# Patient Record
Sex: MALE | Race: WHITE | Employment: FULL TIME | URBAN - METROPOLITAN AREA
[De-identification: names, ages, dates, MRNs, and addresses within clinical notes are randomized per-mention and may not be internally consistent; named-entity substitution may affect disease eponyms.]

---

## 2023-12-26 ENCOUNTER — TELEPHONE (OUTPATIENT)
Dept: ORTHOPEDIC SURGERY | Age: 38
End: 2023-12-26

## 2023-12-26 ENCOUNTER — PREP FOR PROCEDURE (OUTPATIENT)
Dept: ORTHOPEDIC SURGERY | Age: 38
End: 2023-12-26

## 2023-12-26 ENCOUNTER — OFFICE VISIT (OUTPATIENT)
Dept: ORTHOPEDIC SURGERY | Age: 38
End: 2023-12-26
Payer: COMMERCIAL

## 2023-12-26 ENCOUNTER — HOSPITAL ENCOUNTER (OUTPATIENT)
Dept: GENERAL RADIOLOGY | Age: 38
Discharge: HOME | End: 2023-12-28
Payer: COMMERCIAL

## 2023-12-26 DIAGNOSIS — S82.832A CLOSED FRACTURE OF DISTAL END OF LEFT FIBULA, UNSPECIFIED FRACTURE MORPHOLOGY, INITIAL ENCOUNTER: Primary | ICD-10-CM

## 2023-12-26 DIAGNOSIS — S82.832A CLOSED FRACTURE OF DISTAL END OF LEFT FIBULA, UNSPECIFIED FRACTURE MORPHOLOGY, INITIAL ENCOUNTER: ICD-10-CM

## 2023-12-26 PROCEDURE — 73610 X-RAY EXAM OF ANKLE: CPT

## 2023-12-26 PROCEDURE — 99214 OFFICE O/P EST MOD 30 MIN: CPT

## 2023-12-26 PROCEDURE — 29515 APPLICATION SHORT LEG SPLINT: CPT

## 2023-12-26 RX ORDER — SODIUM CHLORIDE 0.9 % (FLUSH) 0.9 %
5-40 SYRINGE (ML) INJECTION PRN
Status: CANCELLED | OUTPATIENT
Start: 2023-12-26

## 2023-12-26 RX ORDER — ASPIRIN 81 MG/1
81 TABLET ORAL 2 TIMES DAILY
Qty: 60 TABLET | Refills: 3 | Status: SHIPPED | OUTPATIENT
Start: 2023-12-26 | End: 2024-04-24

## 2023-12-26 RX ORDER — ACETAMINOPHEN 325 MG/1
650 TABLET ORAL EVERY 6 HOURS PRN
COMMUNITY

## 2023-12-26 RX ORDER — SODIUM CHLORIDE 9 MG/ML
INJECTION, SOLUTION INTRAVENOUS PRN
Status: CANCELLED | OUTPATIENT
Start: 2023-12-26

## 2023-12-26 RX ORDER — SODIUM CHLORIDE 0.9 % (FLUSH) 0.9 %
5-40 SYRINGE (ML) INJECTION EVERY 12 HOURS SCHEDULED
Status: CANCELLED | OUTPATIENT
Start: 2023-12-26

## 2023-12-26 RX ORDER — HYDROCODONE BITARTRATE AND ACETAMINOPHEN 5; 325 MG/1; MG/1
1 TABLET ORAL EVERY 6 HOURS PRN
Qty: 28 TABLET | Refills: 0 | Status: SHIPPED | OUTPATIENT
Start: 2023-12-26 | End: 2024-01-02

## 2023-12-26 NOTE — PROGRESS NOTES
[facility].   Only one vehicle - per patient, is permitted in parking lot.   Upon entering the parking lot, a voucher ticket will print.    EDUCATION INSTRUCTIONS:           [] [facility] Pamphlet placed in chart.   [] Pre-admission Testing educational folder given  [] Incentive Spirometry,coughing & deep breathing exercises reviewed.   [] Fluoroscopy-Xray used in surgery reviewed with patient. Educational pamphlet placed in chart.  [] Pain: Post-op pain is normal and to be expected. You will be asked to rate your pain from 0-10.  [] Joint camp offered & Joint replacement booklets given.  [] Follow instruction sheet for Ensure/ Protein drinks - provided to you during PAT apt.    MEDICATION INSTRUCTIONS:    [x] Bring a complete list of your medications, please write the last time you took the medicine, give this list to the nurse in Pre-Op.  [x] Take only the following medications the morning of surgery with 1-2 ounces of water: Norco if needed  [] Stop all herbal supplements and vitamins 5 days before surgery. Stop NSAIDS 7 days before surgery.  [] DO NOT take any diabetic medicine the morning of surgery.  Follow instructions for insulin the day before surgery.  [] If you are diabetic and your blood sugar is low or you feel symptomatic, you may drink 1-2 ounces of apple juice or take a glucose tablet.            -The morning of your procedure, you may call the pre-op area if you have concerns about your blood sugar [phone].  [] Use your inhalers the morning of surgery.  Bring your emergency inhaler with you day of surgery.  [x] Follow physician instructions regarding any blood thinners you may be taking.    WHAT TO EXPECT:    [x] The day of surgery you will be greeted and checked in by the [place] lob . In addition, you will be registered in the [place] [facility] by a Patient Access Representative. Please bring your photo ID and insurance card. A nurse will greet you in  accordance to the time you are needed in the pre-op area to prepare you for surgery. Please do not be discouraged if you are not greeted in the order you arrive as there are many variables that are involved in patient preparation. Your patience is greatly appreciated as you wait for your nurse.   [] Delays may occur with surgery and staff will make a sincere effort to keep you informed of delays. If any delays occur with your procedure, we apologize ahead of time for your inconvenience as we recognize the value of your time.

## 2023-12-26 NOTE — H&P
Negative for nausea, vomiting, abdominal pain, diarrhea, constipation  or black or bloody.  Hematologic\Lymphatic:  negative for bleeding, petechiae,   Genitourinary: Negative for hematuria and difficulty urinating.   Musculoskeletal: Negative for neck pain and stiffness. Mild for back pain, negative joint swelling and gait problem.   Skin: Negative for pallor, rash and wound.   Neurological: Negative for dizziness, tremors, seizures, weakness, light-headedness, no TIA or stroke symptoms. No numbness and headaches.   Psychiatric/Behavioral: Negative.      Physical Examination:   General appearance: alert, well appearing, and in no distress,  normal appearing weight  Mental status: alert, oriented to person, place, and time, normal mood, behavior, speech, dress, motor activity, and thought processes  Abdomen: soft, nondistended   Resp:   resp easy and unlabored, no audible wheezes note  Cardiac: distal pulses palpable, skin well perfused  Neurological: alert, oriented X3, normal speech, no focal findings or movement disorder noted, motor and sensory grossly normal bilaterally, normal muscle tone, no tremors, strength 5/5, normal gait and station  HEENT: normochephalic atraumatic, external ears and eyes normal, sclera normal, neck supple  Extremities:   peripheral pulses normal, no edema, redness or tenderness in the calves   Skin: normal coloration, no rashes or open wounds, no suspicious skin lesions noted  Psych: Affect euthymic   Musculoskeletal:    Extremity:  Left Lower Extremity  Splint intact, but only reaches to mid calf and foot is in PF  Compartments supple throughout thigh and leg  Calf supple and nontender  Demonstrates active knee flexion/extension,great toe extension.   States sensation intact to touch above and below pslint  Palpable dorsalis pedis, cap refill brisk in toes, foot warm/perfused.           XR: 12/26/23      Multiple views of L ankle demonstrating distal fibula fracture above the

## 2023-12-26 NOTE — PATIENT INSTRUCTIONS
Procedure: Open reduction with internal fixation of left ankle fracture    Your surgery is scheduled for Wednesday 1/3/24 at 10:00AM with Dr. Laurence Dancer, DO at the main [address] [facility] in [facility] . You will need to report to Preop area  that morning at 8:00AM .   All surgery start times are subject to change. You will be notified by office and/or PAT department if your surgery time changes. If you need to cancel/reschedule your surgery for any reason, please contact Larissa at [phone], ext #[phone] ASAP. You are having Outpatient surgery, but plan for 1-2 nights in the hospital for recovery if needed. Preadmission Testing (PAT) department at [facility] will contact you with further details regarding pre-operative blood work, where to park and enter the hospital, your medication list, etc. If you have any surgery related questions please contact PAT at [address] at [phone]. Nothing to eat or drink after midnight the night before surgery. You may take a pain pill and any other medicine PAT instructs you to take with small sip of water if needed. Continue with ice and elevation to reduce swelling  No weight bearing left lower extremity, use assistive devices if needed (wheelchair, walker, crutches, cane, etc). If you are taking Aspirin or Lovenox, hold the day of surgery. If taking Eliquis, hold this 48 hours prior to surgery. Hold all NSAIDs (non-steroidal anti-inflammatories like Advil, motrin, ibuprofen, etc) 7 days prior to surgery. Call office with any question or concerns: [phone]    All surgical patients will be gradually tapered off narcotic pain medication post operatively, this office will not continue narcotics longer than 6 weeks from date of surgery. If narcotics are required longer than this time period you will be referred to pain management.                   Open Reduction With Internal Fixation of a Limb: What to Expect at [address]

## 2023-12-26 NOTE — TELEPHONE ENCOUNTER
Patient was seen today in our office for ER follow-up appointment from 12- for Left Distal Fibula Fx. This is a Workers Comp Injury case. DOI: 12-. During his appointment today, patient presented the FOS with a Work Status Report Form to fill out and fax back to his employer. Form does not have a fax number listed on it. Patient does not know a fax number either. I called his work supervisor, Heather Todd. No answer. LMOM asking for Buck Jasmine to call the office back with a fax number, please speak with Michelle Ruffin or Tomasa Gallagher. Work Status Report Form was placed in the Workers Comp folder.

## 2023-12-26 NOTE — PROGRESS NOTES
Orthopaedic H&P Note    Trinidad Powers is a 34 y.o. male, his YOB: 1985 with the following history as recorded in [facility]: There are no problems to display for this patient. No current outpatient medications on file. No current facility-administered medications for this visit. Allergies: Patient has no known allergies. History reviewed. No pertinent past medical history. History reviewed. No pertinent surgical history. History reviewed. No pertinent family history. Social History     Tobacco Use    Smoking status: Every Day     Types: Cigarettes    Smokeless tobacco: Never   Substance Use Topics    Alcohol use: Not on file     Comment: social                             Chief Complaint   Patient presents with    Follow-up     ED f/u  L distal Fib Fx 12/19/23. Pt ambulating w/ crutches. Pt states 8/10 pain. SUBJECTIVE: Trinidad Powers is here for initial evaluation of his left ankle. This is a Worker's Compensation injury, works for [employer] and fell on approximately 12/19/2023. Went to the emergency department found to have a distal fibula fracture with ankle mortise widening. He is NWB L LE with splint, using crutches. He was not given DVT ppx or pain medication. Review of Systems   Constitutional: Negative for fever, chills, diaphoresis, appetite change and fatigue. HENT: Negative for dental issues, hearing loss and tinnitus. Negative for congestion, sinus pressure, sneezing, sore throat. Negative for headache. Eyes: Negative for visual disturbance, blurred and double vision. Negative for pain, discharge, redness and itching  Respiratory: Negative for cough, shortness of breath and wheezing. Cardiovascular: Negative for chest pain, palpitations and leg swelling. No dyspnea on exertion   Gastrointestinal:   Negative for nausea, vomiting, abdominal pain, diarrhea, constipation  or black or bloody.   Hematologic\Lymphatic:  negative for bleeding, petechiae,

## 2023-12-26 NOTE — TELEPHONE ENCOUNTER
Prior Authorization Form:    DEMOGRAPHICS:                     Patient Name:  Marlon File  Patient :  1985            Insurance:  Payor: Loly Holden / Plan: Luann Adamson / Product Type: *No Product type* /   Insurance ID Number:    Payer/Plan Subscr  Sex Relation Sub. Ins. ID Effective Group Num   1. FIDELINA CMS Marlon File 1985 Male Self  1/1/15                                    [address] [address]       [] Prior authorization obtained  [] No Prior authorization required   [x] Prior authorization pending - This is a Workers Compensation Injury Case. Recent DOI: 2023. Patient does not have a Claim # yet or Allowed Diagnosis yet. Retro C-9 will need submitted after surgery is performed and the patient has Allowed Diagnosis and Claim #.        DIAGNOSIS & PROCEDURE:                       Procedure/Operation: Left Distal Fibula Fracture ORIF            CPT Code: 68062    Diagnosis:  Closed Fracture of Distal End of Left Fibula with Mortise Widening     ICD10 Code: E70.666X    Location:  [address] [place] [address]    Surgeon:  Dr. Karma Ruggiero INFORMATION:                          Date: 2024    Time: 10:30 am               Anesthesia:  General and Regional                                                        Status: Outpatient     Special Comments:         Vendor: [employer]  []   Notified     Length of Surgery (with 30 min clean up time): 1.5 hours    Medical clearance: No Medical Clearance Needed    Pre-Op Labs:       []  Orders Placed    Electronically signed by Vicki Silvestre MA on 2023 at 10:57 AM

## 2023-12-26 NOTE — H&P (VIEW-ONLY)
Orthopaedic H&P Note     Jimmy Lawson is a 38 y.o. male, his YOB: 1985 with the following history as recorded in [facility]:        There are no problems to display for this patient.     Current Facility-Administered Medications   No current outpatient medications on file.      No current facility-administered medications for this visit.         Allergies: Patient has no known allergies.  Past Medical History   History reviewed. No pertinent past medical history.     Past Surgical History   History reviewed. No pertinent surgical history.     Family History   History reviewed. No pertinent family history.     Social History            Tobacco Use    Smoking status: Every Day       Types: Cigarettes    Smokeless tobacco: Never   Substance Use Topics    Alcohol use: Not on file       Comment: social                                    Chief Complaint   Patient presents with    Follow-up       ED f/u  L distal Fib Fx 12/19/23. Pt ambulating w/ crutches. Pt states 8/10 pain.                SUBJECTIVE: Jimmy Lawson is here for initial evaluation of his left ankle.  This is a Worker's Compensation injury, works for [employer] and fell on approximately 12/19/2023.  Went to the emergency department found to have a distal fibula fracture with ankle mortise widening. He is NWB L LE with splint, using crutches. He was not given DVT ppx or pain medication.         Review of Systems   Constitutional: Negative for fever, chills, diaphoresis, appetite change and fatigue.   HENT: Negative for dental issues, hearing loss and tinnitus. Negative for congestion, sinus pressure, sneezing, sore throat. Negative for headache.  Eyes: Negative for visual disturbance, blurred and double vision. Negative for pain, discharge, redness and itching  Respiratory: Negative for cough, shortness of breath and wheezing.   Cardiovascular: Negative for chest pain, palpitations and leg swelling. No dyspnea on exertion   Gastrointestinal:

## 2024-01-02 NOTE — PROGRESS NOTES
Patient notified of new arrival time for procedure tomorrow. New arrival time is now 0810am. Patient verbalized understanding.

## 2024-01-03 ENCOUNTER — ANESTHESIA EVENT (OUTPATIENT)
Dept: OPERATING ROOM | Age: 39
End: 2024-01-03
Payer: COMMERCIAL

## 2024-01-03 ENCOUNTER — ANESTHESIA (OUTPATIENT)
Dept: OPERATING ROOM | Age: 39
End: 2024-01-03
Payer: COMMERCIAL

## 2024-01-03 ENCOUNTER — HOSPITAL ENCOUNTER (OUTPATIENT)
Age: 39
Setting detail: OUTPATIENT SURGERY
Discharge: HOME | End: 2024-01-03
Attending: ORTHOPAEDIC SURGERY | Admitting: ORTHOPAEDIC SURGERY
Payer: COMMERCIAL

## 2024-01-03 ENCOUNTER — APPOINTMENT (OUTPATIENT)
Dept: GENERAL RADIOLOGY | Age: 39
End: 2024-01-03
Attending: ORTHOPAEDIC SURGERY
Payer: COMMERCIAL

## 2024-01-03 VITALS
TEMPERATURE: 97.2 F | WEIGHT: 250 LBS | BODY MASS INDEX: 40.18 KG/M2 | SYSTOLIC BLOOD PRESSURE: 159 MMHG | OXYGEN SATURATION: 98 % | HEART RATE: 77 BPM | RESPIRATION RATE: 15 BRPM | DIASTOLIC BLOOD PRESSURE: 101 MMHG | HEIGHT: 66 IN

## 2024-01-03 DIAGNOSIS — G89.18 POST-OP PAIN: Primary | ICD-10-CM

## 2024-01-03 LAB
ALBUMIN SERPL-MCNC: 4.6 G/DL (ref 3.5–5.2)
ALP SERPL-CCNC: 99 U/L (ref 40–129)
ALT SERPL-CCNC: 40 U/L (ref 0–40)
ANION GAP SERPL CALCULATED.3IONS-SCNC: 14 MMOL/L (ref 7–16)
AST SERPL-CCNC: 24 U/L (ref 0–39)
BASOPHILS # BLD: 0.11 K/UL (ref 0–0.2)
BASOPHILS NFR BLD: 1 % (ref 0–2)
BILIRUB SERPL-MCNC: 0.4 MG/DL (ref 0–1.2)
BUN SERPL-MCNC: 11 MG/DL (ref 6–20)
CALCIUM SERPL-MCNC: 9.8 MG/DL (ref 8.6–10.2)
CHLORIDE SERPL-SCNC: 103 MMOL/L (ref 98–107)
CO2 SERPL-SCNC: 23 MMOL/L (ref 22–29)
CREAT SERPL-MCNC: 0.9 MG/DL (ref 0.7–1.2)
EOSINOPHIL # BLD: 0.34 K/UL (ref 0.05–0.5)
EOSINOPHILS RELATIVE PERCENT: 4 % (ref 0–6)
ERYTHROCYTE [DISTWIDTH] IN BLOOD BY AUTOMATED COUNT: 12.3 % (ref 11.5–15)
GFR SERPL CREATININE-BSD FRML MDRD: >60 ML/MIN/1.73M2
GLUCOSE SERPL-MCNC: 100 MG/DL (ref 74–99)
HCT VFR BLD AUTO: 48 % (ref 37–54)
HGB BLD-MCNC: 16.5 G/DL (ref 12.5–16.5)
IMM GRANULOCYTES # BLD AUTO: <0.03 K/UL (ref 0–0.58)
IMM GRANULOCYTES NFR BLD: 0 % (ref 0–5)
LYMPHOCYTES NFR BLD: 2.65 K/UL (ref 1.5–4)
LYMPHOCYTES RELATIVE PERCENT: 32 % (ref 20–42)
MCH RBC QN AUTO: 31 PG (ref 26–35)
MCHC RBC AUTO-ENTMCNC: 34.4 G/DL (ref 32–34.5)
MCV RBC AUTO: 90.1 FL (ref 80–99.9)
MONOCYTES NFR BLD: 0.69 K/UL (ref 0.1–0.95)
MONOCYTES NFR BLD: 8 % (ref 2–12)
NEUTROPHILS NFR BLD: 54 % (ref 43–80)
NEUTS SEG NFR BLD: 4.55 K/UL (ref 1.8–7.3)
PLATELET # BLD AUTO: 431 K/UL (ref 130–450)
PMV BLD AUTO: 9.5 FL (ref 7–12)
POTASSIUM SERPL-SCNC: 3.9 MMOL/L (ref 3.5–5)
PROT SERPL-MCNC: 8.1 G/DL (ref 6.4–8.3)
RBC # BLD AUTO: 5.33 M/UL (ref 3.8–5.8)
SODIUM SERPL-SCNC: 140 MMOL/L (ref 132–146)
WBC OTHER # BLD: 8.4 K/UL (ref 4.5–11.5)

## 2024-01-03 PROCEDURE — 2500000003 HC RX 250 WO HCPCS: Performed by: ORTHOPAEDIC SURGERY

## 2024-01-03 PROCEDURE — 7100000011 HC PHASE II RECOVERY - ADDTL 15 MIN: Performed by: ORTHOPAEDIC SURGERY

## 2024-01-03 PROCEDURE — 3600000017 HC SURGERY HYBRID ADDL 15MIN: Performed by: ORTHOPAEDIC SURGERY

## 2024-01-03 PROCEDURE — 3700000000 HC ANESTHESIA ATTENDED CARE: Performed by: ORTHOPAEDIC SURGERY

## 2024-01-03 PROCEDURE — 7100000010 HC PHASE II RECOVERY - FIRST 15 MIN: Performed by: ORTHOPAEDIC SURGERY

## 2024-01-03 PROCEDURE — 7100000000 HC PACU RECOVERY - FIRST 15 MIN: Performed by: ORTHOPAEDIC SURGERY

## 2024-01-03 PROCEDURE — 2709999900 HC NON-CHARGEABLE SUPPLY: Performed by: ORTHOPAEDIC SURGERY

## 2024-01-03 PROCEDURE — 27829 TREAT LOWER LEG JOINT: CPT | Performed by: ORTHOPAEDIC SURGERY

## 2024-01-03 PROCEDURE — 73610 X-RAY EXAM OF ANKLE: CPT

## 2024-01-03 PROCEDURE — 27792 TREATMENT OF ANKLE FRACTURE: CPT | Performed by: ORTHOPAEDIC SURGERY

## 2024-01-03 PROCEDURE — 2500000003 HC RX 250 WO HCPCS: Performed by: NURSE ANESTHETIST, CERTIFIED REGISTERED

## 2024-01-03 PROCEDURE — 2500000003 HC RX 250 WO HCPCS: Performed by: ANESTHESIOLOGY

## 2024-01-03 PROCEDURE — 80053 COMPREHEN METABOLIC PANEL: CPT

## 2024-01-03 PROCEDURE — 2580000003 HC RX 258: Performed by: PHYSICIAN ASSISTANT

## 2024-01-03 PROCEDURE — 85025 COMPLETE CBC W/AUTO DIFF WBC: CPT

## 2024-01-03 PROCEDURE — 3700000001 HC ADD 15 MINUTES (ANESTHESIA): Performed by: ORTHOPAEDIC SURGERY

## 2024-01-03 PROCEDURE — C1713 ANCHOR/SCREW BN/BN,TIS/BN: HCPCS | Performed by: ORTHOPAEDIC SURGERY

## 2024-01-03 PROCEDURE — 7100000001 HC PACU RECOVERY - ADDTL 15 MIN: Performed by: ORTHOPAEDIC SURGERY

## 2024-01-03 PROCEDURE — 6360000002 HC RX W HCPCS: Performed by: NURSE ANESTHETIST, CERTIFIED REGISTERED

## 2024-01-03 PROCEDURE — 3600000007 HC SURGERY HYBRID BASE: Performed by: ORTHOPAEDIC SURGERY

## 2024-01-03 PROCEDURE — 6360000002 HC RX W HCPCS: Performed by: PHYSICIAN ASSISTANT

## 2024-01-03 PROCEDURE — 2720000010 HC SURG SUPPLY STERILE: Performed by: ORTHOPAEDIC SURGERY

## 2024-01-03 DEVICE — PLATE BNE L117MM 10 H S STL 1/3 TBLR LOK COMPR W/ CLLR FOR: Type: IMPLANTABLE DEVICE | Site: FIBULA | Status: FUNCTIONAL

## 2024-01-03 DEVICE — SCREW CORTES 3.5MM SELF-TAPPING 18MM: Type: IMPLANTABLE DEVICE | Site: FIBULA | Status: FUNCTIONAL

## 2024-01-03 DEVICE — SCREW BNE L20MM DIA2.7MM CORT S STL ST FULL THRD FOR SM: Type: IMPLANTABLE DEVICE | Site: FIBULA | Status: FUNCTIONAL

## 2024-01-03 DEVICE — KIT REPAIR SYNDESMOSIS SS STRL FIBULINK: Type: IMPLANTABLE DEVICE | Site: FIBULA | Status: FUNCTIONAL

## 2024-01-03 DEVICE — SCREW BNE L16MM DIA4MM CANC S STL ST CANN NONLOCKING FULL: Type: IMPLANTABLE DEVICE | Site: FIBULA | Status: FUNCTIONAL

## 2024-01-03 DEVICE — SCREW BNE L16MM DIA3.5MM CORT S STL ST NONCANNULATED LOK: Type: IMPLANTABLE DEVICE | Site: FIBULA | Status: FUNCTIONAL

## 2024-01-03 DEVICE — SCREW BNE L18MM DIA4MM CANC S STL ST CANN NONLOCKING FULL: Type: IMPLANTABLE DEVICE | Site: FIBULA | Status: FUNCTIONAL

## 2024-01-03 RX ORDER — SODIUM CHLORIDE 0.9 % (FLUSH) 0.9 %
5-40 SYRINGE (ML) INJECTION PRN
Status: DISCONTINUED | OUTPATIENT
Start: 2024-01-03 | End: 2024-01-03 | Stop reason: HOSPADM

## 2024-01-03 RX ORDER — HYDROMORPHONE HYDROCHLORIDE 1 MG/ML
0.25 INJECTION, SOLUTION INTRAMUSCULAR; INTRAVENOUS; SUBCUTANEOUS EVERY 5 MIN PRN
Status: DISCONTINUED | OUTPATIENT
Start: 2024-01-03 | End: 2024-01-03 | Stop reason: HOSPADM

## 2024-01-03 RX ORDER — SODIUM CHLORIDE 0.9 % (FLUSH) 0.9 %
5-40 SYRINGE (ML) INJECTION EVERY 12 HOURS SCHEDULED
Status: DISCONTINUED | OUTPATIENT
Start: 2024-01-03 | End: 2024-01-03 | Stop reason: HOSPADM

## 2024-01-03 RX ORDER — MIDAZOLAM HYDROCHLORIDE 1 MG/ML
INJECTION INTRAMUSCULAR; INTRAVENOUS PRN
Status: DISCONTINUED | OUTPATIENT
Start: 2024-01-03 | End: 2024-01-03 | Stop reason: SDUPTHER

## 2024-01-03 RX ORDER — FENTANYL CITRATE 50 UG/ML
INJECTION, SOLUTION INTRAMUSCULAR; INTRAVENOUS PRN
Status: DISCONTINUED | OUTPATIENT
Start: 2024-01-03 | End: 2024-01-03 | Stop reason: SDUPTHER

## 2024-01-03 RX ORDER — SODIUM CHLORIDE 9 MG/ML
INJECTION, SOLUTION INTRAVENOUS PRN
Status: DISCONTINUED | OUTPATIENT
Start: 2024-01-03 | End: 2024-01-03 | Stop reason: HOSPADM

## 2024-01-03 RX ORDER — ONDANSETRON 2 MG/ML
4 INJECTION INTRAMUSCULAR; INTRAVENOUS
Status: DISCONTINUED | OUTPATIENT
Start: 2024-01-03 | End: 2024-01-03 | Stop reason: HOSPADM

## 2024-01-03 RX ORDER — ROCURONIUM BROMIDE 10 MG/ML
INJECTION, SOLUTION INTRAVENOUS PRN
Status: DISCONTINUED | OUTPATIENT
Start: 2024-01-03 | End: 2024-01-03 | Stop reason: SDUPTHER

## 2024-01-03 RX ORDER — NEOSTIGMINE METHYLSULFATE 1 MG/ML
INJECTION, SOLUTION INTRAVENOUS PRN
Status: DISCONTINUED | OUTPATIENT
Start: 2024-01-03 | End: 2024-01-03 | Stop reason: SDUPTHER

## 2024-01-03 RX ORDER — DEXAMETHASONE SODIUM PHOSPHATE 10 MG/ML
INJECTION INTRAMUSCULAR; INTRAVENOUS PRN
Status: DISCONTINUED | OUTPATIENT
Start: 2024-01-03 | End: 2024-01-03 | Stop reason: SDUPTHER

## 2024-01-03 RX ORDER — ONDANSETRON 2 MG/ML
INJECTION INTRAMUSCULAR; INTRAVENOUS PRN
Status: DISCONTINUED | OUTPATIENT
Start: 2024-01-03 | End: 2024-01-03 | Stop reason: SDUPTHER

## 2024-01-03 RX ORDER — PROPOFOL 10 MG/ML
INJECTION, EMULSION INTRAVENOUS PRN
Status: DISCONTINUED | OUTPATIENT
Start: 2024-01-03 | End: 2024-01-03 | Stop reason: SDUPTHER

## 2024-01-03 RX ORDER — LIDOCAINE HYDROCHLORIDE 20 MG/ML
INJECTION, SOLUTION INTRAVENOUS PRN
Status: DISCONTINUED | OUTPATIENT
Start: 2024-01-03 | End: 2024-01-03 | Stop reason: SDUPTHER

## 2024-01-03 RX ORDER — HYDROMORPHONE HYDROCHLORIDE 2 MG/ML
INJECTION, SOLUTION INTRAMUSCULAR; INTRAVENOUS; SUBCUTANEOUS PRN
Status: DISCONTINUED | OUTPATIENT
Start: 2024-01-03 | End: 2024-01-03 | Stop reason: SDUPTHER

## 2024-01-03 RX ORDER — HYDROMORPHONE HYDROCHLORIDE 1 MG/ML
0.5 INJECTION, SOLUTION INTRAMUSCULAR; INTRAVENOUS; SUBCUTANEOUS EVERY 5 MIN PRN
Status: COMPLETED | OUTPATIENT
Start: 2024-01-03 | End: 2024-01-03

## 2024-01-03 RX ORDER — LIDOCAINE HYDROCHLORIDE 10 MG/ML
INJECTION, SOLUTION EPIDURAL; INFILTRATION; INTRACAUDAL; PERINEURAL PRN
Status: DISCONTINUED | OUTPATIENT
Start: 2024-01-03 | End: 2024-01-03 | Stop reason: ALTCHOICE

## 2024-01-03 RX ORDER — GLYCOPYRROLATE 0.2 MG/ML
INJECTION INTRAMUSCULAR; INTRAVENOUS PRN
Status: DISCONTINUED | OUTPATIENT
Start: 2024-01-03 | End: 2024-01-03 | Stop reason: SDUPTHER

## 2024-01-03 RX ORDER — OXYCODONE HYDROCHLORIDE 5 MG/1
5 TABLET ORAL EVERY 6 HOURS PRN
Qty: 28 TABLET | Refills: 0 | Status: SHIPPED | OUTPATIENT
Start: 2024-01-03 | End: 2024-01-10

## 2024-01-03 RX ORDER — KETOROLAC TROMETHAMINE 30 MG/ML
INJECTION, SOLUTION INTRAMUSCULAR; INTRAVENOUS PRN
Status: DISCONTINUED | OUTPATIENT
Start: 2024-01-03 | End: 2024-01-03 | Stop reason: SDUPTHER

## 2024-01-03 RX ADMIN — KETOROLAC TROMETHAMINE 15 MG: 30 INJECTION, SOLUTION INTRAMUSCULAR; INTRAVENOUS at 15:24

## 2024-01-03 RX ADMIN — HYDROMORPHONE HYDROCHLORIDE 1 MG: 2 INJECTION, SOLUTION INTRAMUSCULAR; INTRAVENOUS; SUBCUTANEOUS at 15:49

## 2024-01-03 RX ADMIN — HYDROMORPHONE HYDROCHLORIDE 0.5 MG: 1 INJECTION, SOLUTION INTRAMUSCULAR; INTRAVENOUS; SUBCUTANEOUS at 16:37

## 2024-01-03 RX ADMIN — CEFAZOLIN 2000 MG: 2 INJECTION, POWDER, FOR SOLUTION INTRAMUSCULAR; INTRAVENOUS at 14:13

## 2024-01-03 RX ADMIN — FENTANYL CITRATE 50 MCG: 0.05 INJECTION, SOLUTION INTRAMUSCULAR; INTRAVENOUS at 14:45

## 2024-01-03 RX ADMIN — SODIUM CHLORIDE: 9 INJECTION, SOLUTION INTRAVENOUS at 15:28

## 2024-01-03 RX ADMIN — GLYCOPYRROLATE 0.6 MG: 0.2 INJECTION INTRAMUSCULAR; INTRAVENOUS at 15:28

## 2024-01-03 RX ADMIN — LIDOCAINE HYDROCHLORIDE 50 MG: 20 INJECTION, SOLUTION INTRAVENOUS at 14:08

## 2024-01-03 RX ADMIN — SODIUM CHLORIDE: 9 INJECTION, SOLUTION INTRAVENOUS at 08:17

## 2024-01-03 RX ADMIN — ROCURONIUM BROMIDE 50 MG: 10 INJECTION, SOLUTION INTRAVENOUS at 14:08

## 2024-01-03 RX ADMIN — FENTANYL CITRATE 100 MCG: 0.05 INJECTION, SOLUTION INTRAMUSCULAR; INTRAVENOUS at 14:08

## 2024-01-03 RX ADMIN — DEXAMETHASONE SODIUM PHOSPHATE 10 MG: 10 INJECTION INTRAMUSCULAR; INTRAVENOUS at 14:21

## 2024-01-03 RX ADMIN — ONDANSETRON 4 MG: 2 INJECTION INTRAMUSCULAR; INTRAVENOUS at 14:21

## 2024-01-03 RX ADMIN — FENTANYL CITRATE 50 MCG: 0.05 INJECTION, SOLUTION INTRAMUSCULAR; INTRAVENOUS at 14:30

## 2024-01-03 RX ADMIN — MIDAZOLAM 2 MG: 1 INJECTION INTRAMUSCULAR; INTRAVENOUS at 13:58

## 2024-01-03 RX ADMIN — HYDROMORPHONE HYDROCHLORIDE 1 MG: 2 INJECTION, SOLUTION INTRAMUSCULAR; INTRAVENOUS; SUBCUTANEOUS at 15:37

## 2024-01-03 RX ADMIN — FENTANYL CITRATE 50 MCG: 0.05 INJECTION, SOLUTION INTRAMUSCULAR; INTRAVENOUS at 15:09

## 2024-01-03 RX ADMIN — Medication 3 MG: at 15:28

## 2024-01-03 RX ADMIN — PROPOFOL 200 MG: 10 INJECTION, EMULSION INTRAVENOUS at 14:08

## 2024-01-03 RX ADMIN — HYDROMORPHONE HYDROCHLORIDE 0.5 MG: 1 INJECTION, SOLUTION INTRAMUSCULAR; INTRAVENOUS; SUBCUTANEOUS at 16:30

## 2024-01-03 ASSESSMENT — PAIN DESCRIPTION - ORIENTATION: ORIENTATION: LEFT

## 2024-01-03 ASSESSMENT — PAIN - FUNCTIONAL ASSESSMENT: PAIN_FUNCTIONAL_ASSESSMENT: 0-10

## 2024-01-03 ASSESSMENT — PAIN DESCRIPTION - DESCRIPTORS
DESCRIPTORS: DISCOMFORT
DESCRIPTORS: DISCOMFORT

## 2024-01-03 ASSESSMENT — PAIN SCALES - GENERAL
PAINLEVEL_OUTOF10: 7
PAINLEVEL_OUTOF10: 7

## 2024-01-03 ASSESSMENT — LIFESTYLE VARIABLES: SMOKING_STATUS: 1

## 2024-01-03 ASSESSMENT — PAIN DESCRIPTION - LOCATION: LOCATION: LEG

## 2024-01-03 NOTE — DISCHARGE INSTRUCTIONS
[facility] Department of Orthopedic Surgery  [address] [facility] [address]    Dr. Dudley Bruce, DO         MD Dr. Dudley Hebert MD Frank Ansevin, PA-C Sara Zatchok, PA-C Tyler Tsangaris PA-C      Orthopaedics Discharge Instructions   Weight bearing Status - Non-weight bearing - on left lower Extremity  Pain medication Per Prescriptions  Contact Office for Medication Refill- [phone]  Office can refill pain med every 7 days  If patient discharging to facility then pain control will be continued per facility physician  Ice to operative/injured site for 15-30 minutes of each hour for next 5 days    Recommend that you continue to ice the area 2-3 times per day after this   Elevate operative/injured limb on 2 pillows at home  Goal is to have limb above the heart if able  Continue DVT Prophylaxis (blood clot prevention) as Prescribed: aspirin   Wound care - Please keep dressing/splint clean and dry    Follow Up in Office in 2 weeks. Your first post op appointment is often with one of our PAs.     Call the office at [phone] or directions or with any questions.  Watch for these significant complications.  Call physician if they or any other problems occur:  Fever over 101°, redness, swelling or warmth at the operative site  Unrelieved nausea    Foul smelling or cloudy drainage at the operative site   Unrelieved pain    Blood soaked dressing. (Some oozing may be normal)     Numb, pale, blue, cold or tingling extremity    Future Appointments   Date Time Provider Department Center   1/22/2024  9:00 AM SCHEDULE, SE ORTHO [facility]         It is the Department of Orthopaedic Trauma's standard of practice that providers will de-escalate(wean) all patients from narcotic(opioid) medications during the post-operative period.   We provide multimodal pain control but opioid medications are tapered in all of our patients.  If patient requires referral to pain management for  prolonged taper off of opioid pain medication we will facilitate this process.

## 2024-01-03 NOTE — ANESTHESIA POSTPROCEDURE EVALUATION
Department of Anesthesiology  Postprocedure Note    Patient: Jimmy Lawson  MRN: [id number]  YOB: 1985  Date of evaluation: 1/3/2024    Procedure Summary       Date: 01/03/24 Room / Location: [address]    Anesthesia Start: 1358 Anesthesia Stop: 1600    Procedure: LEFT DISTAL FIBULA FRACTURE OPEN REDUCTION INTERNAL FIXATION (Left: Leg Lower) Diagnosis:       Closed fracture of left distal fibula      (Closed fracture of left distal fibula [S82.832A])    Surgeons: Dudley Bruce DO Responsible Provider: Klever Brooks DO    Anesthesia Type: general ASA Status: 3            Anesthesia Type: No value filed.    Megan Phase I: Megan Score: 10    Megan Phase II:      Anesthesia Post Evaluation    Patient location during evaluation: PACU  Patient participation: complete - patient participated  Level of consciousness: awake and alert  Pain score: 2  Airway patency: patent  Nausea & Vomiting: no nausea and no vomiting  Cardiovascular status: hemodynamically stable  Respiratory status: acceptable  Hydration status: euvolemic  Multimodal analgesia pain management approach  Pain management: adequate        No notable events documented.

## 2024-01-03 NOTE — ANESTHESIA PRE PROCEDURE
Department of Anesthesiology  Preprocedure Note       Name:  Jimmy Lawson   Age:  38 y.o.  :  1985                                          MRN:  [id number]         Date:  1/3/2024      Surgeon: Surgeon(s):  Dudley Bruce DO    Procedure: Procedure(s):  LEFT DISTAL FIBULA FRACTURE OPEN REDUCTION INTERNAL FIXATION    Medications prior to admission:   Prior to Admission medications    Medication Sig Start Date End Date Taking? Authorizing Provider   acetaminophen (TYLENOL) 325 MG tablet Take 2 tablets by mouth every 6 hours as needed for Pain    Provider, MD Olga   aspirin 81 MG EC tablet Take 1 tablet by mouth 2 times daily 23  Mario Alberto Horton PA-C       Current medications:    Current Facility-Administered Medications   Medication Dose Route Frequency Provider Last Rate Last Admin    sodium chloride flush 0.9 % injection 5-40 mL  5-40 mL IntraVENous 2 times per day Mario Alberto Horton PA-C        sodium chloride flush 0.9 % injection 5-40 mL  5-40 mL IntraVENous PRN Mario Alberto Horton PA-C        0.9 % sodium chloride infusion   IntraVENous PRN Mario Alberto Horton PA-C 30 mL/hr at 24 1304 NoRateChange at 24 1304    ceFAZolin (ANCEF) 2,000 mg in sterile water 20 mL IV syringe  2,000 mg IntraVENous On Call to OR Mario Alberto Horton PA-C           Allergies:    Allergies   Allergen Reactions    Eggs Or Egg-Derived Products Hives       Problem List:    Patient Active Problem List   Diagnosis Code    Closed fracture of left distal fibula S82.832A       Past Medical History:  History reviewed. No pertinent past medical history.    Past Surgical History:  History reviewed. No pertinent surgical history.    Social History:    Social History     Tobacco Use    Smoking status: Every Day     Types: Cigarettes    Smokeless tobacco: Never   Substance Use Topics    Alcohol use: Not on file     Comment: social                                Ready to

## 2024-01-03 NOTE — INTERVAL H&P NOTE
Update History & Physical    The patient's History and Physical of December 26, 2023 was reviewed with the patient and I examined the patient. There was no change. The surgical site was confirmed by the patient and me.     Plan: Left ankle fracture open reduction with internal fixation, possible syndesmotic fixation     I have explained the risks and complications of the recommended surgery with the patient at length, as well as discussed potential treatment alternatives including nonoperative management. These risks include but are not limited to death or complication from anesthesia, continued pain, nerve tendon or vascular injury, infection, nonunion or malunion, symptomatic hardware or hardware failure, deep vein thrombosis or pulmonary embolism, stiffness, wound healing complications, unforeseen complications, and need for further surgery, etc.  Patient understood this, asked appropriate questions, which were all answered, and he has elected to proceed with the procedure.    Electronically signed by JOSEPH GARY DO on 1/3/2024 at 9:53 AM

## 2024-01-03 NOTE — PROGRESS NOTES
Xrays completed.  Discharge instructions reviewed and patient verbalizes understanding.  Discharge via wheelchair with non weight bearing.

## 2024-01-03 NOTE — PROGRESS NOTES
1558  transferred to PACU  post ORIF fibula.  Leg elevated.  Ice applied.  Toes warm , pink, brisk capillary refill. Movement and sensation present.  Xrays called for

## 2024-01-04 NOTE — OP NOTE
Operative Note      Patient: Jimmy Lawson  YOB: 1985  MRN: [id number]    Date of Procedure: 1/3/2024    Pre-Op Diagnosis :  Closed fracture left distal fibula  Syndesmotic injury left ankle    Post-Op Diagnosis: Same       Procedure(s):  Left distal fibula fracture open reduction with internal fixation  Left distal tibiofibular syndesmosis reduction with internal fixation      Surgeon(s):  Dudley Bruce DO    Assistant:   Resident: River Dunham DO; King Zimmerman DO; Raymond Mann DO    Anesthesia: General    Estimated Blood Loss (mL): less than 50     Complications: None    Specimens:   * No specimens in log *    Implants:  Implant Name Type Inv. Item Serial No.  Lot No. LRB No. Used Action   SCREW BNE L20MM DIA2.7MM CASSIE S STL ST FULL THRD FOR SM - [id number]  SCREW BNE L20MM DIA2.7MM CASSIE S STL ST FULL THRD FOR SM  DEPU[employer]WD  Left 1 Implanted   SCREW BNE L16MM DIA3.5MM CASSIE S STL ST NONCANNULATED TUNDE - [id number]  SCREW BNE L16MM DIA3.5MM CASSIE S STL ST NONCANNULATED TUNDE  DEPUY [employer]-WD  Left 3 Implanted   SCREW BNE L16MM DIA4MM CANC S STL ST ELIZ NONLOCKING FULL - [id number]  SCREW BNE L16MM DIA4MM CANC S STL ST ELIZ NONLOCKING FULL  DEPUY [employer]-WD  Left 2 Implanted   PLATE BNE L117MM 10 H S STL 1/3 TBLR TUNDE COMPR W/ CLLR FOR - [id number]  PLATE BNE L117MM 10 H S STL 1/3 TBLR TUNDE COMPR W/ CLLR FOR  DEPUY [employer]ES [employer]-WD  Left 1 Implanted   SCREW BNE L18MM DIA4MM CANC S STL ST ELIZ NONLOCKING FULL - [id number]  SCREW BNE L18MM DIA4MM CANC S STL ST ELIZ NONLOCKING FULL  DEPUY [employer]-WD  Left 1 Implanted   KIT REPAIR SYNDESMOSIS SS STRL FIBULINK - [id number]  KIT REPAIR SYNDESMOSIS SS STRL FIBULINK  DEPUY [employer]-WD [id number] Left 1 Implanted   SCREW ARORA 3.5MM SELF-TAPPING 18MM - [id number]  SCREW ARORA 3.5MM SELF-TAPPING 18MM  [employer] USA-WD  Left 1 Implanted         Drains: * No LDAs found *    Findings: Left displaced short oblique  corresponding to the syndesmotic trajectory.  The syndesmosis was stressed utilizing dorsiflexion and external rotation maneuver.  There was notable gapping at the syndesmosis and talar tilt therefore it was decided that the syndesmosis be addressed.  The syndesmosis was reduced clinically and fluoroscopically and a [employer] Fibulink was placed at this point in time and the syndesmosis was stressed once more.  The syndesmosis was adequately reduced without gapping laterally.  Talar tilt improved and ankle mortise was congruent.    Both wounds were then copiously irrigated with sterile normal saline. An #0 Vicryl was used to close the lateral side in a water tight fashion of the deep tissue. Irrigation was carried out once again. Subcutaneous tissue on the lateral side of the ankle was closed with 2-0 Monocryl in an interrupted fashion. Skin was closed with 3-0 nylon in an interrupted fashion. Compartments were soft and compressible. Sterile dressings were placed as well as a well-padded 3 sided splint. The patient was then reversed from anesthesia without complication and transferred back to the hospital room bed by multiple individuals in a safe fashion. The patient was taken to the PACU in a stable condition.       Disposition:   (1) Strict nonweightbearing of the left lower extremity. (2) Aggressive Ice and elevation to left lower extremity. (3) Pain medications and aspirin DVT prophylaxis. (4) Patient will follow up in the office in 2 weeks for suture removal as well as repeat x-rays of the left ankle and potentially if his skin looks good, will transition him into a boot at that point in time.     It should be noted that Dr. Bruce was present for the entirety of the case     Electronically signed by River Dunham DO on 1/3/2024    Orthopaedic Attending    I was present and scrubbed throughout the procedure.    Electronically signed by   Dudley Bruce DO  1/3/2024     NOTE: This report was transcribed using

## 2024-01-18 DIAGNOSIS — S82.832A CLOSED FRACTURE OF DISTAL END OF LEFT FIBULA, UNSPECIFIED FRACTURE MORPHOLOGY, INITIAL ENCOUNTER: Primary | ICD-10-CM

## 2024-01-19 ENCOUNTER — OFFICE VISIT (OUTPATIENT)
Dept: ORTHOPEDIC SURGERY | Age: 39
End: 2024-01-19
Payer: COMMERCIAL

## 2024-01-19 ENCOUNTER — HOSPITAL ENCOUNTER (OUTPATIENT)
Dept: GENERAL RADIOLOGY | Age: 39
Discharge: HOME | End: 2024-01-19
Payer: COMMERCIAL

## 2024-01-19 VITALS — BODY MASS INDEX: 40.18 KG/M2 | WEIGHT: 250 LBS | HEIGHT: 66 IN

## 2024-01-19 DIAGNOSIS — G89.18 POST-OP PAIN: ICD-10-CM

## 2024-01-19 DIAGNOSIS — S82.832A CLOSED FRACTURE OF DISTAL END OF LEFT FIBULA, UNSPECIFIED FRACTURE MORPHOLOGY, INITIAL ENCOUNTER: ICD-10-CM

## 2024-01-19 DIAGNOSIS — S82.832A CLOSED FRACTURE OF DISTAL END OF LEFT FIBULA, UNSPECIFIED FRACTURE MORPHOLOGY, INITIAL ENCOUNTER: Primary | ICD-10-CM

## 2024-01-19 PROCEDURE — L4350 ANKLE CONTROL ORTHO PRE OTS: HCPCS

## 2024-01-19 PROCEDURE — 73610 X-RAY EXAM OF ANKLE: CPT

## 2024-01-19 PROCEDURE — 99212 OFFICE O/P EST SF 10 MIN: CPT

## 2024-01-19 RX ORDER — OXYCODONE HYDROCHLORIDE 5 MG/1
5 TABLET ORAL EVERY 6 HOURS PRN
Qty: 28 TABLET | Refills: 0 | Status: SHIPPED | OUTPATIENT
Start: 2024-01-19 | End: 2024-01-26

## 2024-01-19 NOTE — PATIENT INSTRUCTIONS
Nonweightbearing left lower extremity.    Continue aspirin for DVT prophylaxis.    Patient placed into a boot today.  Okay to remove boot for hygiene and ankle/foot range of motion exercises    If Steri-Strips do not fall off left ankle incision in 7 days on their own, okay to remove.    Follow-up in 4 weeks for reevaluation and x-rays.    Call if any questions or concerns.

## 2024-02-09 DIAGNOSIS — S82.832A CLOSED FRACTURE OF DISTAL END OF LEFT FIBULA, UNSPECIFIED FRACTURE MORPHOLOGY, INITIAL ENCOUNTER: Primary | ICD-10-CM

## 2024-02-12 ENCOUNTER — HOSPITAL ENCOUNTER (OUTPATIENT)
Dept: GENERAL RADIOLOGY | Age: 39
Discharge: HOME | End: 2024-02-14
Payer: COMMERCIAL

## 2024-02-12 ENCOUNTER — OFFICE VISIT (OUTPATIENT)
Dept: ORTHOPEDIC SURGERY | Age: 39
End: 2024-02-12
Payer: COMMERCIAL

## 2024-02-12 VITALS — BODY MASS INDEX: 40.18 KG/M2 | HEIGHT: 66 IN | WEIGHT: 250 LBS

## 2024-02-12 DIAGNOSIS — S82.832A CLOSED FRACTURE OF DISTAL END OF LEFT FIBULA, UNSPECIFIED FRACTURE MORPHOLOGY, INITIAL ENCOUNTER: Primary | ICD-10-CM

## 2024-02-12 PROCEDURE — 73610 X-RAY EXAM OF ANKLE: CPT

## 2024-02-12 PROCEDURE — 99024 POSTOP FOLLOW-UP VISIT: CPT | Performed by: PHYSICIAN ASSISTANT

## 2024-02-12 PROCEDURE — 99212 OFFICE O/P EST SF 10 MIN: CPT

## 2024-02-12 NOTE — PROGRESS NOTES
Chief Complaint   Patient presents with    Follow-up     Patient here for  [facility] - 6 week po check, LT Distal Fib Fx ORIF, DOS 01/03/2024 [facility] (GP* 04/02/2024). Patient states pain lvl is a 2         OP:SURGEON: Dr. Dudley Bruce DO  DATE OF PROCEDURE: 1-3-24  PROCEDURE: Left distal fibula fracture open reduction with internal fixation  Left distal tibiofibular syndesmosis reduction with internal fixation    Subjective:  Jimmy Lawson is approximately 6 weeks follow-up from the above surgery.  He has remained nonweightbearing to the left lower extremity.  He has maintained a cam boot and crutches for ambulation.  He has not started physical therapy.  He is taking aspirin twice daily for DVT prophylaxis.  Very minimal to no pain at the left ankle, but does report dependent rubor.  Steri-Strips still intact.  Denies calf pain, CP, SOB, fever, chills, myalgias.    Review of Systems -  all pertinent positives and negatives in HPI.      Objective:    General: Alert and oriented X 3, normocephalic atraumatic, external ears and eye normal, sclera clear, no acute distress, respirations easy and unlabored with no audible wheezes, skin warm and dry, speech and dress appropriate for noted age, affect euthymic.    Extremity:  Left Lower Extremity  Skin clean dry and intact, without signs of infection  Incisions well approximated with scabs, Steri-Strips intact  Moderate edema throughout the ankle and foot with mild dependent rubor appreciated  Compartments supple throughout thigh and leg  Calf supple and nontender  Demonstrates active knee flexion/extension, ankle plantar/dorsiflexion/great toe extension.   States sensation intact to touch in sural/deep peroneal/superficial peroneal/saphenous/posterior tibial nerve distributions to foot/ankle.   Palpable dorsalis pedis and posterior tibialis pulses, cap refill brisk in toes, foot warm/perfused.           XR:   3 views of L ankle demonstrating mild interval healing appreciated

## 2024-02-29 ENCOUNTER — TELEPHONE (OUTPATIENT)
Dept: ORTHOPEDIC SURGERY | Age: 39
End: 2024-02-29

## 2024-02-29 NOTE — TELEPHONE ENCOUNTER
Received C-9 approval for physical therapy via fax. PT order in chart from 2/12/24 does not have a location. Call placed to patient at this time to inquire about preferred PT location. No answer. Left patient detailed voicemail and requested callback with his desired PT location.    Electronically signed by Melissa Costa ATC on 2/29/2024 at 10:47 AM    Future Appointments   Date Time Provider Department Center   3/12/2024  8:00 AM SCHEDULE, [facility]  [facility]

## 2024-02-29 NOTE — TELEPHONE ENCOUNTER
Patient returned call to office. Patient states that his physical therapy was set up through [employer] with his self-insured worker's comp. Patient is treating at [facility] in [place].    Call placed to [facility] to ensure they have all documentation needed for patient. Spoke to Tri who states they have his [facility] approvals on file and they do not need anything from us regarding his worker's comp approval at this time.    Electronically signed by Melissa Costa ATC on 2/29/2024 at 11:50 AM    Future Appointments   Date Time Provider Department Center   3/12/2024  8:00 AM SCHEDULE, [facility]

## 2024-03-12 ENCOUNTER — OFFICE VISIT (OUTPATIENT)
Dept: ORTHOPEDIC SURGERY | Age: 39
End: 2024-03-12
Payer: COMMERCIAL

## 2024-03-12 ENCOUNTER — HOSPITAL ENCOUNTER (OUTPATIENT)
Dept: GENERAL RADIOLOGY | Age: 39
Discharge: HOME | End: 2024-03-14
Payer: COMMERCIAL

## 2024-03-12 DIAGNOSIS — S82.832A CLOSED FRACTURE OF DISTAL END OF LEFT FIBULA, UNSPECIFIED FRACTURE MORPHOLOGY, INITIAL ENCOUNTER: Primary | ICD-10-CM

## 2024-03-12 DIAGNOSIS — S82.832A CLOSED FRACTURE OF DISTAL END OF LEFT FIBULA, UNSPECIFIED FRACTURE MORPHOLOGY, INITIAL ENCOUNTER: ICD-10-CM

## 2024-03-12 PROCEDURE — 73610 X-RAY EXAM OF ANKLE: CPT

## 2024-03-12 PROCEDURE — 99024 POSTOP FOLLOW-UP VISIT: CPT | Performed by: PHYSICIAN ASSISTANT

## 2024-03-12 PROCEDURE — 73590 X-RAY EXAM OF LOWER LEG: CPT

## 2024-03-12 PROCEDURE — 99212 OFFICE O/P EST SF 10 MIN: CPT

## 2024-03-12 NOTE — PROGRESS NOTES
and left ankle films demonstrate s/p ORIF left distal fibula fracture with syndesmotic repair with hardware in stable position alignment.  No evidence of hardware loosening or failure.    Assessment:   Diagnosis Orders   1. Closed fracture of distal end of left fibula, unspecified fracture morphology, subsequent encounter          Plan:  Xrays reviewed with patient. Plan of care discussed in detail, all questions sought and answered to patients satisfaction at this time.     Start progressive weightbearing on the left lower extremity in Boot. Start with 25% of body weight for 7-10 days and if tolerating well, no significant increased pain or swelling ok to progress to 50% of body weight in Boot for 7-10 days. Then to 75% of body weight for 7-10 days, then to 100% on on left lower extremity in the Boot. Once full weight bearing in the Boot for 1-2 weeks and tolerating well OK to start weaning out of Boot.    At this point, boot only needs to be worn while weightbearing.    Continue PT.    Continue aspirin for DVT prophylaxis.    Follow-up in 6 weeks for reevaluation and x-rays.    Call if any questions or concerns    [facility] Patient Plan Of Care  New Orders Needing C-9 Authorization: None at this appointment  [employer]/Work Status: Plan for RTW on 3/18/2024 with listed restrictions  Patient Progressing: Progressing as expected  Patient candidate for Vocational Rehab at this time: No  Patient determined to be MMI at this visit: No    Electronically signed by ZACHARY Fatima on 3/12/2024 at 9:25 AM  Note: This report was completed using KeyMe voiced recognition software.  Every effort has been made to ensure accuracy; however, inadvertent computerized transcription errors may be present.

## 2024-03-12 NOTE — PATIENT INSTRUCTIONS
Start progressive weightbearing on the left lower extremity in Boot. Start with 25% of body weight for 7-10 days and if tolerating well, no significant increased pain or swelling ok to progress to 50% of body weight in Boot for 7-10 days. Then to 75% of body weight for 7-10 days, then to 100% on on left lower extremity in the Boot. Once full weight bearing in the Boot for 1-2 weeks and tolerating well OK to start weaning out of Boot.    At this point, boot only needs to be worn while weightbearing.    Continue PT.    Continue aspirin for DVT prophylaxis.    Follow-up in 6 weeks for reevaluation and x-rays.    Call if any questions or concerns

## 2024-04-23 DIAGNOSIS — S82.832A CLOSED FRACTURE OF DISTAL END OF LEFT FIBULA, UNSPECIFIED FRACTURE MORPHOLOGY, INITIAL ENCOUNTER: Primary | ICD-10-CM

## 2024-04-25 ENCOUNTER — HOSPITAL ENCOUNTER (OUTPATIENT)
Dept: GENERAL RADIOLOGY | Age: 39
Discharge: HOME | End: 2024-04-27
Payer: COMMERCIAL

## 2024-04-25 ENCOUNTER — OFFICE VISIT (OUTPATIENT)
Dept: ORTHOPEDIC SURGERY | Age: 39
End: 2024-04-25
Payer: COMMERCIAL

## 2024-04-25 DIAGNOSIS — S82.832A CLOSED FRACTURE OF DISTAL END OF LEFT FIBULA, UNSPECIFIED FRACTURE MORPHOLOGY, INITIAL ENCOUNTER: ICD-10-CM

## 2024-04-25 DIAGNOSIS — S82.832A CLOSED FRACTURE OF DISTAL END OF LEFT FIBULA, UNSPECIFIED FRACTURE MORPHOLOGY, INITIAL ENCOUNTER: Primary | ICD-10-CM

## 2024-04-25 PROCEDURE — 99212 OFFICE O/P EST SF 10 MIN: CPT

## 2024-04-25 PROCEDURE — 73610 X-RAY EXAM OF ANKLE: CPT

## 2024-04-25 PROCEDURE — 99213 OFFICE O/P EST LOW 20 MIN: CPT | Performed by: PHYSICIAN ASSISTANT

## 2024-04-25 NOTE — PATIENT INSTRUCTIONS
WBAT to the left lower extremity.    Continue therapy.    Continue light duty. Sedentary duty only. No lifting greater than 50 pounds.     Follow up in 6-8 weeks.   
31-Oct-2022 14:43

## 2024-04-25 NOTE — PROGRESS NOTES
distributions to foot/ankle.  Palpable dorsalis pedis and posterior tibialis pulses, cap refill brisk in toes, foot warm/perfused.    There were no vitals taken for this visit.    XR:   Xr of the left ankle were obtained today in the office and reviewed with the patient which demonstrates left ankle ORIF with syndesmosis repair. No interval changes in hardware.     Assessment:   Diagnosis Orders   1. Closed fracture of distal end of left fibula, unspecified fracture morphology, subsequent encounter            Plan:  Xrays reviewed with patient  Plan of care discussed in detail, all questions sought and answered to patients satisfaction at this time.   WBAT to the left lower extremity.   Continue therapy.   Continue sedentary duty only.  Follow up in  6-8 weeks  for repeat xrays of the left ankle     [facility] Patient Plan Of Care  New Orders Needing C-9 Authorization: Additional PT  [employer]/Work Status: Continue light duty restrictions.   Patient Progressing: Progressing as expected  Patient candidate for Vocational Rehab at this time: No  Patient determined to be MMI at this visit: No    Electronically signed by Jennifer Whyte PA-C on 4/25/2024 at 8:21 AM  Note: This report was completed using [employer] voiced recognition software.  Every effort has been made to ensure accuracy; however, inadvertent computerized transcription errors may be present.

## 2024-05-15 DIAGNOSIS — S82.832A CLOSED FRACTURE OF DISTAL END OF LEFT FIBULA, UNSPECIFIED FRACTURE MORPHOLOGY, INITIAL ENCOUNTER: Primary | ICD-10-CM

## 2024-06-06 ENCOUNTER — HOSPITAL ENCOUNTER (OUTPATIENT)
Dept: GENERAL RADIOLOGY | Age: 39
Discharge: HOME | End: 2024-06-08
Payer: COMMERCIAL

## 2024-06-06 ENCOUNTER — OFFICE VISIT (OUTPATIENT)
Dept: ORTHOPEDIC SURGERY | Age: 39
End: 2024-06-06
Payer: COMMERCIAL

## 2024-06-06 DIAGNOSIS — S82.832A CLOSED FRACTURE OF DISTAL END OF LEFT FIBULA, UNSPECIFIED FRACTURE MORPHOLOGY, INITIAL ENCOUNTER: Primary | ICD-10-CM

## 2024-06-06 DIAGNOSIS — S82.832A CLOSED FRACTURE OF DISTAL END OF LEFT FIBULA, UNSPECIFIED FRACTURE MORPHOLOGY, INITIAL ENCOUNTER: ICD-10-CM

## 2024-06-06 PROCEDURE — 99212 OFFICE O/P EST SF 10 MIN: CPT

## 2024-06-06 PROCEDURE — 99213 OFFICE O/P EST LOW 20 MIN: CPT

## 2024-06-06 PROCEDURE — 73610 X-RAY EXAM OF ANKLE: CPT

## 2024-06-06 NOTE — PROGRESS NOTES
Hardware remains intact without interval displacement, loosening, or failure. No significant change in alignment. No acute fractures or dislocations or any other osseus abnormality identified.    Assessment:   Diagnosis Orders   1. Closed fracture of distal end of left fibula, unspecified fracture morphology, subsequent encounter            Plan:  Reviewed imaging with patient today in office   Otc analgesics, RICE tx prn   WB:  Full weight bearing left lower extremity  Therapy: Continue with PT for remaining approved sessions; continue with HEP and stretching for ROM of ankle  May return to work full duty    Call office with any questions or concerns    [facility] Patient Plan Of Care  New Orders Needing C-9 Authorization: None at this appointment  [employer]/Work Status: Released to work full duty no restrictions    Patient Progressing: Progressing as expected  Patient candidate for Vocational Rehab at this time: No  Patient determined to be MMI at this visit: No       Follow up 3 months with XR of the left ankle      Electronically signed by GUERO Alvarez CNP on 6/6/2024 at 2:03 PM  Note: This report was completed using [employer] voiced recognition software.  Every effort has been made to ensure accuracy; however, inadvertent computerized transcription errors may be present.

## 2024-08-30 DIAGNOSIS — S82.832A CLOSED FRACTURE OF DISTAL END OF LEFT FIBULA, UNSPECIFIED FRACTURE MORPHOLOGY, INITIAL ENCOUNTER: Primary | ICD-10-CM

## 2024-09-05 ENCOUNTER — HOSPITAL ENCOUNTER (OUTPATIENT)
Dept: GENERAL RADIOLOGY | Age: 39
Discharge: HOME | End: 2024-09-07
Payer: COMMERCIAL

## 2024-09-05 ENCOUNTER — OFFICE VISIT (OUTPATIENT)
Dept: ORTHOPEDIC SURGERY | Age: 39
End: 2024-09-05
Payer: COMMERCIAL

## 2024-09-05 DIAGNOSIS — S82.832A CLOSED FRACTURE OF DISTAL END OF LEFT FIBULA, UNSPECIFIED FRACTURE MORPHOLOGY, INITIAL ENCOUNTER: ICD-10-CM

## 2024-09-05 DIAGNOSIS — S82.832A CLOSED FRACTURE OF DISTAL END OF LEFT FIBULA, UNSPECIFIED FRACTURE MORPHOLOGY, INITIAL ENCOUNTER: Primary | ICD-10-CM

## 2024-09-05 PROCEDURE — 99212 OFFICE O/P EST SF 10 MIN: CPT | Performed by: PHYSICIAN ASSISTANT

## 2024-09-05 PROCEDURE — 73610 X-RAY EXAM OF ANKLE: CPT

## 2024-09-05 NOTE — PROGRESS NOTES
distributions to foot/ankle.  Palpable dorsalis pedis and posterior tibialis pulses, cap refill brisk in toes, foot warm/perfused.    There were no vitals taken for this visit.    XR:   Xr of the left ankle obtained today in the office and reviewed with the patient demonstrating left ankle ORIF with syndesmosis repair. No evidence of hardware failure or loosening.     Assessment:   Diagnosis Orders   1. Closed fracture of distal end of left fibula, unspecified fracture morphology, subsequent encounter            Plan:  Xrays reviewed with patient  Plan of care discussed in detail, all questions sought and answered to patients satisfaction at this time.   WBAT to the left lower extremity.  Continue full duty at work  Follow up in 1 year for repeat xrays of the left ankle.     [facility] Patient Plan Of Care  New Orders Needing C-9 Authorization: None at this appointment  [employer]/Work Status: Continue full duty work release  Patient Progressing: Progressing as expected  Patient candidate for Vocational Rehab at this time: No  Patient determined to be MMI at this visit: Yes    Electronically signed by Jennifer Whyte PA-C on 9/5/2024 at 2:47 PM  Note: This report was completed using [employer] voiced recognition software.  Every effort has been made to ensure accuracy; however, inadvertent computerized transcription errors may be present.

## (undated) DEVICE — LOWER EXT KNEE DRAPE: Brand: MEDLINE INDUSTRIES, INC.

## (undated) DEVICE — DRESSING,GAUZE,XEROFORM,CURAD,5"X9",ST: Brand: CURAD

## (undated) DEVICE — BIT DRL L125MM DIA2.7MM ST 3 FLUT QUIK CPL NONRADIOPAQUE

## (undated) DEVICE — TUBING SUCT 12FR MAL ALUM SHFT FN CAP VENT UNIV CONN W/ OBT

## (undated) DEVICE — SCREW BNE L22MM DIA2.7MM CORT S STL ST FULL THRD FOR SM
Type: IMPLANTABLE DEVICE | Site: FIBULA | Status: NON-FUNCTIONAL
Removed: 2024-01-03

## (undated) DEVICE — BIT DRL L125MM DIA2MM S STL QUIK CPL FOR LOK COMPR PLT

## (undated) DEVICE — SYRINGE IRRIG 60ML SFT PLIABLE BLB EZ TO GRP 1 HND USE W/

## (undated) DEVICE — BIT DRL L110MM DIA2.5MM ST G QUIK CPL NONRADIOPAQUE W/O STP

## (undated) DEVICE — ELECTRODE PT RET AD L9FT HI MOIST COND ADH HYDRGEL CORDED

## (undated) DEVICE — BANDAGE COMPR W4INXL10YD WHITE/BEIGE E MTRX HK LOOP CLSR

## (undated) DEVICE — DRESSING,GAUZE,XEROFORM,CURAD,1"X8",ST: Brand: CURAD

## (undated) DEVICE — PADDING,UNDERCAST,COTTON, 4"X4YD STERILE: Brand: MEDLINE

## (undated) DEVICE — DRAPE EQUIP CARM 72X42 IN RUBBER BND CLP

## (undated) DEVICE — BIT DRL 3 FLUT 2.7X125 MM QC SS STRL LCP